# Patient Record
Sex: FEMALE | Race: WHITE
[De-identification: names, ages, dates, MRNs, and addresses within clinical notes are randomized per-mention and may not be internally consistent; named-entity substitution may affect disease eponyms.]

---

## 2018-11-25 ENCOUNTER — HOSPITAL ENCOUNTER (EMERGENCY)
Dept: HOSPITAL 61 - ER | Age: 83
Discharge: HOME | End: 2018-11-25
Payer: MEDICARE

## 2018-11-25 VITALS — SYSTOLIC BLOOD PRESSURE: 144 MMHG | DIASTOLIC BLOOD PRESSURE: 77 MMHG

## 2018-11-25 VITALS — WEIGHT: 175 LBS | BODY MASS INDEX: 32.2 KG/M2 | HEIGHT: 62 IN
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
## 2018-11-25 DIAGNOSIS — S80.01XA: Primary | ICD-10-CM
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
## 2018-11-25 DIAGNOSIS — F03.90: ICD-10-CM

## 2018-11-25 DIAGNOSIS — I10: ICD-10-CM

## 2018-11-25 DIAGNOSIS — S39.012A: ICD-10-CM
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
## 2018-11-25 DIAGNOSIS — S00.81XA: ICD-10-CM

## 2018-11-25 PROCEDURE — 73502 X-RAY EXAM HIP UNI 2-3 VIEWS: CPT

## 2018-11-25 PROCEDURE — 70450 CT HEAD/BRAIN W/O DYE: CPT

## 2018-11-25 PROCEDURE — 72100 X-RAY EXAM L-S SPINE 2/3 VWS: CPT

## 2018-11-25 PROCEDURE — 73562 X-RAY EXAM OF KNEE 3: CPT

## 2018-11-25 PROCEDURE — 72131 CT LUMBAR SPINE W/O DYE: CPT

## 2018-11-25 PROCEDURE — 99284 EMERGENCY DEPT VISIT MOD MDM: CPT

## 2018-11-25 NOTE — RAD
Two-view right hip 11/25/2018

 

CLINICAL INDICATION: Right hip pain after fall.

 

COMPARISON: None.

 

FINDINGS:

 

Right hip arthroplasty. No periprosthetic fracture or lucency. The 

visualized soft tissues are unremarkable.

 

IMPRESSION: Right hip arthroplasty that acute periprosthetic fracture or 

traumatic malalignment.

 

Electronically signed by: Ismael Piña MD (11/25/2018 1:12 PM) [facility]

## 2018-11-25 NOTE — RAD
3 view right knee 11/25/2018 12:27 PM

 

CLINICAL INDICATION: AMS. RIGHT KNEE PAIN,LACERATION AFTER FALL X1 DAY AGO

 

 

COMPARISON: None

 

FINDINGS: No acute fracture or traumatic malalignment. Joint spaces 

maintained. No significant knee joint effusion. Vascular calcifications 

posterior to the knee.

 

Impression:No acute osseous abnormality. 

 

Electronically signed by: Ismael Piña MD (11/25/2018 12:29 PM) [facility]

## 2018-11-25 NOTE — PHYS DOC
Past Medical History


Past Medical History:  Dementia, Hypertension


Smoking:  Cigarettes (The patient is a nonsmoker.)


Social History Narrative:  patient lives in a nursing home





Adult General


Chief Complaint


Chief Complaint:  MECHANICAL FALL





HPI


HPI


Patient is an 87-year-old white female who presents to the emergency department 

for evaluation. She has advanced dementia, and reportedly fell last night. She 

sustained an abrasion on her right forehead. She is ambulatory without any 

difficulty baseline including after the fall. It is unclear how she fell. The 

patient does not recall the incident. She is oriented to person, but 

disoriented to place and time. She denies any pain, and has no complaints at 

this time. She has not had any fevers or chills. She denies any headache or 

neck pain, numbness, weakness, hip or back pain. There are no alleviating or 

exacerbating factors to her symptoms.





Review of Systems


Review of Systems





Constitutional: Denies fever or chills []


Eyes: Denies change in visual acuity, redness, or eye pain []


HENT: Denies nasal congestion or sore throat []


Respiratory: Denies cough or shortness of breath []


Cardiovascular: The patient denies any shortness of breath, chest pain, 

palpitations, or orthopnea []


GI: Denies abdominal pain, nausea, vomiting, bloody stools or diarrhea []


: Denies dysuria or hematuria []


Musculoskeletal: Denies neck or back pain or joint pain []


Integument: Denies rash or skin lesions []


Neurologic: Denies headache, focal weakness or sensory changes []


Endocrine: Denies polyuria or polydipsia []





All other systems were reviewed and found to be within normal limits, except as 

documented in this note.





Current Medications


Current Medications





Current Medications








 Medications


  (Trade)  Dose


 Ordered  [facility]  Start Time


 Stop Time Status Last Admin


Dose Admin


 


 Acetaminophen/


 Codeine Phosphate


  (Tylenol #3)  1 tab  1X  ONCE  11/25/18 12:45


 11/25/18 12:46 DC 11/25/18 12:59


1 TAB











Allergies


Allergies





Allergies








Coded Allergies Type Severity Reaction Last Updated Verified


 


  No Known Drug Allergies    11/25/18 No











Physical Exam


Physical Exam


PHYSICAL EXAM:





CONSTITUTIONAL: Well developed, well nourished


HEAD: normocephalic, there is an abrasion on the anterior right forehead, the 

remainder of the cranium is atraumatic


EENT: PERRL, EOMI. Conjunctivae normal color, sclerae non-icteric; moist mucous 

membranes.


NECK: Supple, non-tender; no meningismus.There is full, painless range of 

motion of the cervical spine, without any focal bony midline tenderness to 

palpation.


LUNGS: Lungs CTA, breathing even and unlabored. Normal air movement.


HEART: Regular rate and rhythm, no murmur


CHEST: No deformity; non-tender


ABDOMEN: The abdomen is soft, and non-tender, no masses or bruits.


EXTREM: There is a contusion on the anterior aspect of the right knee, with a 

superficial abrasion. There is no bony tenderness to palpation. There is normal 

range of motion of the right knee without any ligamentous laxity. The remainder 

of the extremities are atraumatic, with Normal ROM; no deformity, no calf 

tenderness. Normal pulses palpable in all extremities. There is no pedal edema.


SKIN: No rash; no diaphoresis


NEURO: Alert; patient is oriented to person only, speech is otherwise normal, CN

's grossly intact; strength grossly intact without focal deficit.


BACK: No CVA TTP.





Current Patient Data


Vital Signs





 Vital Signs








  Date Time  Temp Pulse Resp B/P (MAP) Pulse Ox O2 Delivery O2 Flow Rate FiO2


 


11/25/18 12:59   16  100 Room Air  


 


11/25/18 11:16 98.0 75  140/72 (94)    





 98.0       











EKG


EKG


[]





Radiology/Procedures


Radiology/Procedures


[PROCEDURE: CT HEAD WO CONTRAST





CT head without contrast 11/25/2018


 


Clinical indications: Fall.


 


COMPARISON: None.


 


TECHNIQUE: Multiple CT images of the head were obtained without contrast.


 


*One or more of the following individualized dose reduction techniques 


were utilized for this examination:  


1. Automated exposure control.  


2. Adjustment of the mA and/or kV according to patient size.  


3. Use of iterative reconstruction technique.


 


 


FINDINGS:


No acute intracranial hemorrhage or extra-axial fluid collection. No 


midline shift. Prominence of the ventricles and subarachnoid spaces. The 


basal cisterns are patent. Patchy and confluent periventricular and deep 


white matter low-attenuation. The visualized mastoid air cells are well 


aerated. Paranasal sinus surgery noted.


 


IMPRESSION:


1. No acute intracranial hemorrhage.


2. Moderate nonspecific white matter disease, most, seen with chronic 


small vessel ischemic disease.


3. Mild generalized cerebral atrophy.]








ER physician preliminary right knee x-ray interpretation: No acute abnormality.





PROCEDURE: HIP RIGHT 2 VIEW





Two-view right hip 11/25/2018


 


CLINICAL INDICATION: Right hip pain after fall.


 


COMPARISON: None.


 


FINDINGS:


 


Right hip arthroplasty. No periprosthetic fracture or lucency. The 


visualized soft tissues are unremarkable.


 


IMPRESSION: Right hip arthroplasty that acute periprosthetic fracture or 


traumatic malalignment.


 





PROCEDURE: KNEE RIGHT 3V





3 view right knee 11/25/2018 12:27 PM


 


CLINICAL INDICATION: AMS. RIGHT KNEE PAIN,LACERATION AFTER FALL X1 DAY AGO


 


 


COMPARISON: None


 


FINDINGS: No acute fracture or traumatic malalignment. Joint spaces 


maintained. No significant knee joint effusion. Vascular calcifications 


posterior to the knee.


 


Impression:No acute osseous abnormality. 








PROCEDURE: LUMBAR SPINE 2-3V





3 view lumbar spine 11/25/2018


 


Clinical indications: Low back pain after fall.


 


COMPARISON: None.


 


FINDINGS:


 


There are 6 nonrib-bearing lumbar-type vertebral bodies with the 1st 


considered L1. Grade 2 anterolisthesis L5 on L6. Minimal grade 1 


retrolisthesis L3 on L4 and L4 on L5. Moderate central L3 compression 


deformity with estimated 40 percent vertebral body height loss. There is 


multilevel lumbar disc degeneration to a moderate degree at L2-L3, L3-L4 


and L5-L6 with disc space, endplate sclerosis and mild marginal osteophyte


formation at L3-L4. Probable spondylolisthesis L5 on L6.


 


There is calcified plaque of the abdominal aorta.


 


IMPRESSION:


1. 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


2. Moderate, age-indeterminate, L3 compression deformity.


3. Grade 2 anterolisthesis at L5 L6 and probable L5 spondylolysis.








PROCEDURE: CT LUMBAR SPINE WO CONTRAST





CT lumbar spine without contrast 11/25/2018


 


CLINICAL INDICATION: Fall with back pain.


 


COMPARISON: Same day lumbar spine radiograph


 


TECHNIQUE: Multiple CT images of the lumbar spine were obtained without 


contrast. 


 


*One or more of the following individualized dose reduction techniques 


were utilized for this examination:  


1. Automated exposure control.  


2. Adjustment of the mA and/or kV according to patient size.  


3. Use of iterative reconstruction technique.


 


 


FINDINGS:


There are 6 nonrib-bearing lumbar-type vertebral bodies with the 1st 


considered L1. Diffuse bony demineralization limiting bony detail. There 


is a mild to moderate central L3 compression deformity with estimated 30 


percent vertebral body height loss no significant bony retropulsion or 


acute fracture line. No paravertebral hemorrhage. Otherwise vertebral body


heights are maintained. Grade 2 anterolisthesis L5 on L6 measuring 11 mm. 


There bone stimulator leads posterior to the posterior elements of S1. 


Heterotopic ossification of the posterior elements L4 L5 L5 L6 and L6 S1.


 


Multilevel disc degeneration to moderate degree with disc space narrowing,


endplate sclerosis, marginal osteophyte formation and vertebral vascular 


disc phenomenon at L2-L3, L3-L4 and L5 L6.


 


Dense calcified plaque of the abdominal aorta. There is a small hiatal 


hernia. Left adrenal gland adenoma measuring 1.6 cm. Simple appearing 


exophytic cyst from the inferior pole the left kidney.


 


Segmental analysis:


 


At T12-L1, no significant spinal canal or neural foraminal narrowing.


 


At L1-L2, no significant spinal canal or neural foraminal narrowing.


 


At L2-L3, grade 1 retrolisthesis and facet hypertrophy without significant


spinal canal or neural foraminal narrowing.


 


At L3-L4, grade 1 retrolisthesis and facet hypertrophy resulting in mild 


left neural foraminal narrowing. No significant spinal canal narrowing.


 


At L4-L5, central/right central disc protrusion superimposed on mild 


concentric disc bulging with facet hypertrophy and ligamentum flavum 


thickening resulting in mild spinal canal narrowing.


 


At L5-L6, grade 2 anterolisthesis results in severe spinal canal and 


moderate right neural foraminal narrowing.


 


At L6 -S1, no significant spinal canal or neural foraminal narrowing.


 


IMPRESSION:


1. Note that there are 6 nonrib-bearing lumbar-type vertebral bodies.


2. Chronic appearing mild to moderate L3 compression deformity.


3. At L5-L6, grade 2 anterolisthesis resulting in severe spinal canal 


narrowing.


4. Additional levels of spinal canal and neural foraminal narrowing, as 


detailed above.





Course & Med Decision Making


Course & Med Decision Making


Pertinent and Imaging studies reviewed. (See chart for details)





[12:25 PM:Patient remains stable. I discussed test results, the need for close 

follow-up, and return precautions. Her gait will be tested prior to discharge.]


2:00 PM: The patient was able to stand but not ambulate secondary to pain, 

which she began complaining of in her lower back and right hip. This was not 

reported on her initial arrival, although the patient's ability to provide a 

meaningful history is significantly limited by her underlying dementia. The 

patient did undergo lumbar spine imaging which was abnormal and a follow-up CT 

reveal that the abnormalities are likely chronic not acute. I discussed the 

imaging results with the patient's family and the need for close outpatient 

follow-up and return precautions.





Dragon Disclaimer


Dragon Disclaimer


This electronic medical record was generated, in whole or in part, using a 

voice recognition dictation system.





Departure


Departure


Impression:  


 Primary Impression:  


 Closed head injury


 Additional Impressions:  


 Dementia


 Knee contusion


 Back strain


Disposition:  01 HOME, SELF-CARE


Condition:  STABLE


Patient Instructions:  Contusion, Head Injury, Adult





Problem Qualifiers











COLTON ARANGO MD Nov 25, 2018 11:24

## 2018-11-25 NOTE — RAD
CT head without contrast 11/25/2018

 

Clinical indications: Fall.

 

COMPARISON: None.

 

TECHNIQUE: Multiple CT images of the head were obtained without contrast.

 

*One or more of the following individualized dose reduction techniques 

were utilized for this examination:  

1. Automated exposure control.  

2. Adjustment of the mA and/or kV according to patient size.  

3. Use of iterative reconstruction technique.

 

 

FINDINGS:

No acute intracranial hemorrhage or extra-axial fluid collection. No 

midline shift. Prominence of the ventricles and subarachnoid spaces. The 

basal cisterns are patent. Patchy and confluent periventricular and deep 

white matter low-attenuation. The visualized mastoid air cells are well 

aerated. Paranasal sinus surgery noted.

 

IMPRESSION:

1. No acute intracranial hemorrhage.

2. Moderate nonspecific white matter disease, most, seen with chronic 

small vessel ischemic disease.

3. Mild generalized cerebral atrophy.

 

Electronically signed by: Ismael Piña MD (11/25/2018 11:47 AM) [facility]

## 2018-11-25 NOTE — RAD
3 view lumbar spine 11/25/2018

 

Clinical indications: Low back pain after fall.

 

COMPARISON: None.

 

FINDINGS:

 

There are 6 nonrib-bearing lumbar-type vertebral bodies with the 1st 

considered L1. Grade 2 anterolisthesis L5 on L6. Minimal grade 1 

retrolisthesis L3 on L4 and L4 on L5. Moderate central L3 compression 

deformity with estimated 40 percent vertebral body height loss. There is 

multilevel lumbar disc degeneration to a moderate degree at L2-L3, L3-L4 

and L5-L6 with disc space, endplate sclerosis and mild marginal osteophyte

formation at L3-L4. Probable spondylolisthesis L5 on L6.

 

There is calcified plaque of the abdominal aorta.

 

IMPRESSION:

1. 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

2. Moderate, age-indeterminate, L3 compression deformity.

3. Grade 2 anterolisthesis at L5 L6 and probable L5 spondylolysis.

 

Electronically signed by: Ismael Piña MD (11/25/2018 1:10 PM) [facility]

## 2018-11-25 NOTE — RAD
CT lumbar spine without contrast 11/25/2018

 

CLINICAL INDICATION: Fall with back pain.

 

COMPARISON: Same day lumbar spine radiograph

 

TECHNIQUE: Multiple CT images of the lumbar spine were obtained without 

contrast. 

 

*One or more of the following individualized dose reduction techniques 

were utilized for this examination:  

1. Automated exposure control.  

2. Adjustment of the mA and/or kV according to patient size.  

3. Use of iterative reconstruction technique.

 

 

FINDINGS:

There are 6 nonrib-bearing lumbar-type vertebral bodies with the 1st 

considered L1. Diffuse bony demineralization limiting bony detail. There 

is a mild to moderate central L3 compression deformity with estimated 30 

percent vertebral body height loss no significant bony retropulsion or 

acute fracture line. No paravertebral hemorrhage. Otherwise vertebral body

heights are maintained. Grade 2 anterolisthesis L5 on L6 measuring 11 mm. 

There bone stimulator leads posterior to the posterior elements of S1. 

Heterotopic ossification of the posterior elements L4 L5 L5 L6 and L6 S1.

 

Multilevel disc degeneration to moderate degree with disc space narrowing,

endplate sclerosis, marginal osteophyte formation and vertebral vascular 

disc phenomenon at L2-L3, L3-L4 and L5 L6.

 

Dense calcified plaque of the abdominal aorta. There is a small hiatal 

hernia. Left adrenal gland adenoma measuring 1.6 cm. Simple appearing 

exophytic cyst from the inferior pole the left kidney.

 

Segmental analysis:

 

At T12-L1, no significant spinal canal or neural foraminal narrowing.

 

At L1-L2, no significant spinal canal or neural foraminal narrowing.

 

At L2-L3, grade 1 retrolisthesis and facet hypertrophy without significant

spinal canal or neural foraminal narrowing.

 

At L3-L4, grade 1 retrolisthesis and facet hypertrophy resulting in mild 

left neural foraminal narrowing. No significant spinal canal narrowing.

 

At L4-L5, central/right central disc protrusion superimposed on mild 

concentric disc bulging with facet hypertrophy and ligamentum flavum 

thickening resulting in mild spinal canal narrowing.

 

At L5-L6, grade 2 anterolisthesis results in severe spinal canal and 

moderate right neural foraminal narrowing.

 

At L6 -S1, no significant spinal canal or neural foraminal narrowing.

 

IMPRESSION:

1. Note that there are 6 nonrib-bearing lumbar-type vertebral bodies.

2. Chronic appearing mild to moderate L3 compression deformity.

3. At L5-L6, grade 2 anterolisthesis resulting in severe spinal canal 

narrowing.

4. Additional levels of spinal canal and neural foraminal narrowing, as 

detailed above.

 

Electronically signed by: Ismael Piña MD (11/25/2018 1:55 PM) [facility]

## 2019-02-20 ENCOUNTER — HOSPITAL ENCOUNTER (EMERGENCY)
Dept: HOSPITAL 61 - ER | Age: 84
Discharge: HOME | End: 2019-02-20
Payer: MEDICARE

## 2019-02-20 VITALS — SYSTOLIC BLOOD PRESSURE: 134 MMHG | DIASTOLIC BLOOD PRESSURE: 74 MMHG

## 2019-02-20 VITALS — HEIGHT: 66 IN | WEIGHT: 155 LBS | BODY MASS INDEX: 24.91 KG/M2

## 2019-02-20 DIAGNOSIS — M43.16: ICD-10-CM

## 2019-02-20 DIAGNOSIS — M43.12: ICD-10-CM

## 2019-02-20 DIAGNOSIS — S61.412A: Primary | ICD-10-CM
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
## 2019-02-20 DIAGNOSIS — E27.9: ICD-10-CM

## 2019-02-20 DIAGNOSIS — N28.1: ICD-10-CM

## 2019-02-20 DIAGNOSIS — I10: ICD-10-CM

## 2019-02-20 DIAGNOSIS — M18.9: ICD-10-CM
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
## 2019-02-20 DIAGNOSIS — G30.9: ICD-10-CM
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
## 2019-02-20 DIAGNOSIS — K44.9: ICD-10-CM

## 2019-02-20 DIAGNOSIS — I51.7: ICD-10-CM
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
## 2019-02-20 DIAGNOSIS — F02.80: ICD-10-CM

## 2019-02-20 LAB
ANION GAP SERPL CALC-SCNC: 10 MMOL/L (ref 6–14)
APTT PPP: YELLOW S
BACTERIA #/AREA URNS HPF: 0 /HPF
BASOPHILS # BLD AUTO: 0.1 X10^3/UL (ref 0–0.2)
BASOPHILS NFR BLD: 1 % (ref 0–3)
BILIRUB UR QL STRIP: NEGATIVE
BUN SERPL-MCNC: 22 MG/DL (ref 7–20)
CALCIUM SERPL-MCNC: 9.1 MG/DL (ref 8.5–10.1)
CHLORIDE SERPL-SCNC: 102 MMOL/L (ref 98–107)
CO2 SERPL-SCNC: 30 MMOL/L (ref 21–32)
CREAT SERPL-MCNC: 2.1 MG/DL (ref 0.6–1)
EOSINOPHIL NFR BLD: 0.1 X10^3/UL (ref 0–0.7)
EOSINOPHIL NFR BLD: 1 % (ref 0–3)
ERYTHROCYTE [DISTWIDTH] IN BLOOD BY AUTOMATED COUNT: 17.2 % (ref 11.5–14.5)
FIBRINOGEN PPP-MCNC: CLEAR MG/DL
GFR SERPLBLD BASED ON 1.73 SQ M-ARVRAT: 22.3 ML/MIN
GLUCOSE SERPL-MCNC: 123 MG/DL (ref 70–99)
HCT VFR BLD CALC: 39.7 % (ref 36–47)
HGB BLD-MCNC: 12.9 G/DL (ref 12–15.5)
HYALINE CASTS #/AREA URNS LPF: (no result) /HPF
LYMPHOCYTES # BLD: 1.9 X10^3/UL (ref 1–4.8)
LYMPHOCYTES NFR BLD AUTO: 16 % (ref 24–48)
MCH RBC QN AUTO: 29 PG (ref 25–35)
MCHC RBC AUTO-ENTMCNC: 33 G/DL (ref 31–37)
MCV RBC AUTO: 91 FL (ref 79–100)
MONO #: 0.8 X10^3/UL (ref 0–1.1)
MONOCYTES NFR BLD: 7 % (ref 0–9)
NEUT #: 8.9 X10^3UL (ref 1.8–7.7)
NEUTROPHILS NFR BLD AUTO: 75 % (ref 31–73)
NITRITE UR QL STRIP: NEGATIVE
PH UR STRIP: 7 [PH]
PLATELET # BLD AUTO: 275 X10^3/UL (ref 140–400)
POTASSIUM SERPL-SCNC: 3.8 MMOL/L (ref 3.5–5.1)
PROT UR STRIP-MCNC: 30 MG/DL
RBC # BLD AUTO: 4.38 X10^6/UL (ref 3.5–5.4)
RBC #/AREA URNS HPF: 0 /HPF (ref 0–2)
SODIUM SERPL-SCNC: 142 MMOL/L (ref 136–145)
UROBILINOGEN UR-MCNC: 0.2 MG/DL
WBC # BLD AUTO: 11.8 X10^3/UL (ref 4–11)
WBC #/AREA URNS HPF: (no result) /HPF (ref 0–4)

## 2019-02-20 PROCEDURE — 72192 CT PELVIS W/O DYE: CPT

## 2019-02-20 PROCEDURE — 80048 BASIC METABOLIC PNL TOTAL CA: CPT

## 2019-02-20 PROCEDURE — 72125 CT NECK SPINE W/O DYE: CPT

## 2019-02-20 PROCEDURE — 81001 URINALYSIS AUTO W/SCOPE: CPT

## 2019-02-20 PROCEDURE — 12002 RPR S/N/AX/GEN/TRNK2.6-7.5CM: CPT

## 2019-02-20 PROCEDURE — 72128 CT CHEST SPINE W/O DYE: CPT

## 2019-02-20 PROCEDURE — 71045 X-RAY EXAM CHEST 1 VIEW: CPT

## 2019-02-20 PROCEDURE — P9612 CATHETERIZE FOR URINE SPEC: HCPCS

## 2019-02-20 PROCEDURE — 73130 X-RAY EXAM OF HAND: CPT

## 2019-02-20 PROCEDURE — 36415 COLL VENOUS BLD VENIPUNCTURE: CPT

## 2019-02-20 PROCEDURE — 84484 ASSAY OF TROPONIN QUANT: CPT

## 2019-02-20 PROCEDURE — 85025 COMPLETE CBC W/AUTO DIFF WBC: CPT

## 2019-02-20 PROCEDURE — 70450 CT HEAD/BRAIN W/O DYE: CPT

## 2019-02-20 PROCEDURE — 93005 ELECTROCARDIOGRAM TRACING: CPT

## 2019-02-20 PROCEDURE — 99284 EMERGENCY DEPT VISIT MOD MDM: CPT

## 2019-02-20 NOTE — RAD
PQRS Compliance Statement:

 

One or more of the following individualized dose reduction techniques were

utilized for this examination:  

1. Automated exposure control  

2. Adjustment of the mA and/or kV according to patient size  

3. Use of iterative reconstruction technique

 

CT pelvis and thoracic spine without contrast February 20, 2019

 

INDICATION: Unwitnessed fall.

 

COMPARISON: None available.

 

TECHNIQUE: Multiple axial CT images of the thoracic spine and pelvis were 

obtained without intravenous contrast. Coronal and sagittal reformats are 

provided.

 

FINDINGS:

 

Thoracic spine:

 

The thyroid gland is normal in appearance. There are no pathologically 

enlarged mediastinal and bilateral hilar lymph nodes. There is a moderate 

sized hiatal hernia. Three-vessel coronary artery vascular calcifications 

are identified. Heart size is mildly enlarged. There is a left adrenal 

mass with calcification measuring 2.1 x 1.8 cm with Hounsfield units 

measuring average -4 most suggestive of a lipid rich adrenal adenoma. 

Right adrenal gland is normal in appearance. There is a simple cyst 

arising from the inferior pole the left kidney measuring 3.5 cm. There is 

a simple cyst arising from the superior pole the left kidney measuring 11 

mm. No paraspinal soft tissue abnormality is identified. No epidural 

hematoma is noted. There is biapical pleural-parenchymal scarring.

 

Alignment of the thoracic spine appears normal. Vertebral body heights 

appear maintained. No acute fracture is identified. Mild multilevel disc 

height loss is identified with vacuum disc phenomenon. Mild to moderate 

anterior marginal osteophytosis is noted. There is concavity the superior 

endplate of L3 which is only partially profiled and appears chronic and 

likely secondary to superior endplate Schmorl's node.

 

Pelvis:

 

Distal abdominal aorta appears normal in caliber. There is dense calcified

atheromatous plaque involving the distal abdominal aorta and proximal 

iliac vessels. There are no pathologically enlarged lymph nodes in the 

pelvis. There is no free fluid. There is a small right inguinal hernia 

containing nondilated loops of small bowel. Urinary bladder is within 

normal limits given degree of distention. There is moderate colonic 

diverticulosis without adjacent inflammatory changes.

 

There is grade 2 anterolisthesis of L4 on L5 with bilateral L4 pars 

defects which appear chronic. L4 and L5 appear intact. Sacrum is intact.

 

Right total hip arthroplasty is identified with components in expected 

alignment. There is no lucency surrounding the hardware. Superior and 

inferior pubic rami are intact. Pubic symphysis is well aligned. 

Sacroiliac joints appear well aligned. Acetabula are intact. Productive 

changes are identified along the ischial tuberosities without definite 

fracture. No intramuscular hematoma is identified.

 

IMPRESSION:

1. No acute fracture or malalignment of the thoracic spine. Mild superior 

endplate concavity of L3 is only partially profiled and likely associated 

with chronic Schmorl's node.

2. No acute fracture of the pelvis. Right total hip arthroplasty is 

identified without evidence for hardware failure.

3. Grade 2 anterolisthesis of L4 on L5 likely secondary to chronic 

bilateral L4 pars defects.

4. Moderate-sized hiatal hernia.

5. Mild cardiomegaly.

6. Left adrenal mass, most favored to represent a lipid rich adrenal 

adenoma versus sequela of prior hemorrhage.

7. Left renal cysts.

 

Electronically signed by: Ailyn Morley MD (2/20/2019 5:12 PM) 

[facility]

## 2019-02-20 NOTE — PHYS DOC
Past Medical History


Past Medical History:  Dementia, Hypertension


Past Surgical History:  No Surgical History


Alcohol Use:  None


Drug Use:  None





Adult General


Chief Complaint


Chief Complaint:  UPPER EXTREMITY INJURY





HPI


HPI





Patient is a 87  year old female who presents with comes from [facility]. At approximately 1400 today patient was found on the floor outside of her 

room. Patient usually uses a rolling walker to ambulate. She has Alzheimer's 

and is alert and oriented 1 as always and is currently alert and oriented 1. 

Patient is a DNR. Fall was unwitnessed. Patient does have a dorsal hand full-

thickness laceration that is approximated, non bleeding, lateral and is 3 cm in 

length with tendon showing.





Review of Systems


Review of Systems





Constitutional: Denies fever or chills []


Eyes: Denies change in visual acuity, redness, or eye pain []


HENT: Denies nasal congestion or sore throat []


Respiratory: Denies cough or shortness of breath []


Cardiovascular: No additional information not addressed in HPI []


GI: Denies abdominal pain, nausea, vomiting, bloody stools or diarrhea []


: Denies dysuria or hematuria []


Musculoskeletal: Denies back pain or joint pain []


Integument: Laceration to dorsal left hand.  Denies rash or skin lesions []


Neurologic: Denies headache, focal weakness or sensory changes []








All other systems were reviewed and found to be within normal limits, except as 

documented in this note.





Current Medications


Current Medications





Current Medications








 Medications


  (Trade)  Dose


 Ordered  Sig/Josh  Start Time


 Stop Time Status Last Admin


Dose Admin


 


 Lidocaine/Sodium


 Bicarbonate


  (Buffered


 Lidocaine 1%)  3 ml  1X  ONCE  19 17:45


 19 17:46 DC 19 18:21


3 ML











Allergies


Allergies





Allergies








Coded Allergies Type Severity Reaction Last Updated Verified


 


  No Known Drug Allergies    18 No











Physical Exam


Physical Exam





Constitutional: Well developed, well nourished, no acute distress, non-toxic 

appearance. []


HENT: Normocephalic, atraumatic, bilateral external ears normal, oropharynx 

moist, no oral exudates, nose normal. []


Eyes: PERRLA, EOMI, conjunctiva normal, no discharge. [] 


Neck: Normal range of motion, no tenderness, supple, no stridor. [] 


Cardiovascular:Heart rate regular rhythm, no murmur []


Lungs & Thorax:  Bilateral breath sounds clear to auscultation []


Abdomen: Bowel sounds normal, soft, no tenderness, no masses, no pulsatile 

masses. [] 


Skin: 3cm lacerations, left dorsal hand. Warm, dry, no erythema, no rash. [] 


Back: No tenderness, no CVA tenderness. [] 


Extremities: No tenderness, no cyanosis, no clubbing, ROM intact, no edema. [] 


Neurologic: Alert and oriented X 3, normal motor function, normal sensory 

function, no focal deficits noted. []


Psychologic: Affect normal, judgement normal, mood normal. []





Current Patient Data


Vital Signs





 Vital Signs








  Date Time  Temp Pulse Resp B/P (MAP) Pulse Ox O2 Delivery O2 Flow Rate FiO2


 


19 15:22 98.4 77 18 122/76 (91) 97 Room Air  





 98.4       








Lab Values





 Laboratory Tests








Test


 19


17:10 19


17:25


 


Urine Collection Type Unknown   


 


Urine Color Yellow   


 


Urine Clarity Clear   


 


Urine pH 7.0   


 


Urine Specific Gravity 1.010   


 


Urine Protein


 30 mg/dL


(NEG-TRACE) 





 


Urine Glucose (UA)


 Negative mg/dL


(NEG) 





 


Urine Ketones (Stick)


 Negative mg/dL


(NEG) 





 


Urine Blood


 Negative (NEG)


 





 


Urine Nitrite


 Negative (NEG)


 





 


Urine Bilirubin


 Negative (NEG)


 





 


Urine Urobilinogen Dipstick


 0.2 mg/dL (0.2


mg/dL) 





 


Urine Leukocyte Esterase


 Negative (NEG)


 





 


Urine RBC 0 /HPF (0-2)   


 


Urine WBC


 Occ /HPF (0-4)


 





 


Urine Bacteria


 0 /HPF (0-FEW)


 





 


Urine Hyaline Casts Few /HPF   


 


White Blood Count


 


 11.8 x10^3/uL


(4.0-11.0)  H


 


Red Blood Count


 


 4.38 x10^6/uL


(3.50-5.40)


 


Hemoglobin


 


 12.9 g/dL


(12.0-15.5)


 


Hematocrit


 


 39.7 %


(36.0-47.0)


 


Mean Corpuscular Volume


 


 91 fL ()





 


Mean Corpuscular Hemoglobin  29 pg (25-35)  


 


Mean Corpuscular Hemoglobin


Concent 


 33 g/dL


(31-37)


 


Red Cell Distribution Width


 


 17.2 %


(11.5-14.5)  H


 


Platelet Count


 


 275 x10^3/uL


(140-400)


 


Neutrophils (%) (Auto)  75 % (31-73)  H


 


Lymphocytes (%) (Auto)  16 % (24-48)  L


 


Monocytes (%) (Auto)  7 % (0-9)  


 


Eosinophils (%) (Auto)  1 % (0-3)  


 


Basophils (%) (Auto)  1 % (0-3)  


 


Neutrophils # (Auto)


 


 8.9 x10^3uL


(1.8-7.7)  H


 


Lymphocytes # (Auto)


 


 1.9 x10^3/uL


(1.0-4.8)


 


Monocytes # (Auto)


 


 0.8 x10^3/uL


(0.0-1.1)


 


Eosinophils # (Auto)


 


 0.1 x10^3/uL


(0.0-0.7)


 


Basophils # (Auto)


 


 0.1 x10^3/uL


(0.0-0.2)


 


Sodium Level


 


 142 mmol/L


(136-145)


 


Potassium Level


 


 3.8 mmol/L


(3.5-5.1)


 


Chloride Level


 


 102 mmol/L


()


 


Carbon Dioxide Level


 


 30 mmol/L


(21-32)


 


Anion Gap  10 (6-14)  


 


Blood Urea Nitrogen


 


 22 mg/dL


(7-20)  H


 


Creatinine


 


 2.1 mg/dL


(0.6-1.0)  H


 


Estimated GFR


(Cockcroft-Gault) 


 22.3  





 


Glucose Level


 


 123 mg/dL


(70-99)  H


 


Calcium Level


 


 9.1 mg/dL


(8.5-10.1)


 


Troponin I Quantitative


 


 < 0.017 ng/mL


(0.000-0.055)





 Laboratory Tests


19 17:25








 Laboratory Tests


19 17:25











EKG


EKG


Sinus Rhythm, no STEMI


Interpretation Time:


1701 and read by Dr. Lopez





Radiology/Procedures


Radiology/Procedures


[]


Impressions:


[facility]


 [address]  [place], [place] [address] [phone]


 


 IMAGING REPORT





 Signed





PATIENT: IDRIS BLOCK ACCOUNT: [id number] MRN#: [id number]


: 1931 LOCATION: ER AGE: 87


SEX: F EXAM DT: 19 ACCESSION#: [id number]


STATUS: REG ER ORD. PHYSICIAN: SHEREEN WALTON 


REASON: FALL, UNWITNESSED


PROCEDURE: PORTABLE CHEST 1V





Chest radiograph 2019 5:17 PM


 


INDICATION: Trauma, fall


 


COMPARISON: None available


 


TECHNIQUE: Portable upright frontal view of the chest is provided.


 


FINDINGS:


 


The cardiomediastinal silhouette is within normal limits.


 


There are no pleural effusions. There is no pulmonary vascular congestion.


There is no pneumothorax.


 


The lungs are clear.


 


No significant osseous abnormality is identified.


 


IMPRESSION:


 


No acute cardiopulmonary process.


 


Electronically signed by: Indio Benavides MD (2019 5:54 PM) 


[facility]














DICTATED and SIGNED BY:     INDIO BENAVIDES MD


DATE:     19 1753





 [facility]


 [address] [place] [address] [phone]


 


 IMAGING REPORT





 Signed





PATIENT: IDRIS BLOCK ACCOUNT: [id number] MRN#: [id number]


: 1931 LOCATION: ER AGE: 87


SEX: F EXAM DT: 19 ACCESSION#: [id number]


STATUS: REG ER ORD. PHYSICIAN: SHEREEN WALTON 


REASON: FALL, UNWITNESSED


PROCEDURE: HAND LEFT 3V





Left hand radiograph 2019 5:17 PM


 


INDICATION: Trauma, Fall. Laceration to the dorsal aspect of the left 


hand.


 


COMPARISON: None available.


 


TECHNIQUE:  3 views of the left hand are provided.


 


FINDINGS:


 


There is moderate joint space narrowing with minimal likely chronic 


subluxation of the distal interphalangeal joint. Marginal osteophytosis is


noted. Findings are suggestive of moderate to advanced osteoarthrosis. 


Bone mineralization is within normal limits. Dorsal soft tissue laceration


is identified. There is moderate joint space narrowing involving the first


carpometacarpal joint with marginal osteophytosis compatible with 


osteoarthrosis. No acute fracture is identified.


 


IMPRESSION:


1. Moderate to advanced osteoarthrosis of the second distal 


interphalangeal joint with minimal chronic subluxation.


2. No acute fracture or dislocation.


3. Moderate osteoarthrosis of the first carpometacarpal joint.


 


Electronically signed by: Indio Benavides MD (2019 5:56 PM) 


[facility]














DICTATED and SIGNED BY:     INDIO BENAVIDES MD


DATE:     19





 [facility]


 [address] [place] [address]


 [phone]


 


 IMAGING REPORT





 Signed





PATIENT: IDRIS BLOCK ACCOUNT: [id number] MRN#: [id number]


: 1931 LOCATION: ER AGE: 87


SEX: F EXAM DT: 19 ACCESSION#: [id number]


STATUS: REG ER ORD. PHYSICIAN: SHEREEN WALTON 


REASON: FALL, UNWITNESSES


PROCEDURE: CT HEAD AND CERVICAL SPINE WO





CT of the head without contrast, 2019:


 


HISTORY: Fall, dementia


 


Comparison is made to a study from 2018. There is moderate cerebral 


atrophy. There are moderate patchy bilateral deep white matter lucencies 


compatible with chronic ischemic change. There is also mild cerebellar 


atrophy. The ventricles are mildly enlarged on a compensatory basis. There


is no shift of the midline structures. There is no evidence of acute 


intracranial hemorrhage or mass effect. There is calcific plaquing of the 


distal internal carotid and vertebral arteries.


 


IMPRESSION:


1. Chronic findings as described above.


2. No acute intracranial abnormality is detected.


 


 


 


CT of the cervical spine without contrast, 2019:


 


Noncontrast scans were obtained with multiplanar reconstructions produced.


 


There is moderate disc space narrowing and marginal spurring throughout 


the mid and lower cervical spine. There are moderate degenerative changes 


involving scattered facet joints bilaterally. A slight anterolisthesis at 


C3-4 appears to be due to facet joint arthropathy. No acute fracture is 


identified. No high-grade central spinal stenosis is present. There is 


foraminal stenosis at multiple levels, most severe at C5-6 and C6-7 


bilaterally and at C4-5 on the right.


 


IMPRESSION:


1. Moderate to severe multilevel degenerative change as described above.


2. Mild anterolisthesis at C3-4 due to facet joint arthropathy.


3. No acute bony abnormality is detected.


 


Electronically signed by: Rick Moritz, MD (2019 5:05 PM) [facility]














DICTATED and SIGNED BY:     MORITZ,RICK S MD


DATE:     19 1657





 [facility]


 [address] [place] [address]


 [phone]


 


 IMAGING REPORT





 Signed





PATIENT: IDRIS BLOCK ACCOUNT: [id number] MRN#: [id number]


: 1931 LOCATION: ER AGE: 87


SEX: F EXAM DT: 19 ACCESSION#: [id number]


STATUS: REG ER ORD. PHYSICIAN: SHEREEN WALTON 


REASON: FALL, UNWITNESSES


PROCEDURE: CT PELVIS WO CONTRAST





PQRS Compliance Statement:


 


One or more of the following individualized dose reduction techniques were


utilized for this examination:  


1. Automated exposure control  


2. Adjustment of the mA and/or kV according to patient size  


3. Use of iterative reconstruction technique


 


CT pelvis and thoracic spine without contrast 2019


 


INDICATION: Unwitnessed fall.


 


COMPARISON: None available.


 


TECHNIQUE: Multiple axial CT images of the thoracic spine and pelvis were 


obtained without intravenous contrast. Coronal and sagittal reformats are 


provided.


 


FINDINGS:


 


Thoracic spine:


 


The thyroid gland is normal in appearance. There are no pathologically 


enlarged mediastinal and bilateral hilar lymph nodes. There is a moderate 


sized hiatal hernia. Three-vessel coronary artery vascular calcifications 


are identified. Heart size is mildly enlarged. There is a left adrenal 


mass with calcification measuring 2.1 x 1.8 cm with Hounsfield units 


measuring average -4 most suggestive of a lipid rich adrenal adenoma. 


Right adrenal gland is normal in appearance. There is a simple cyst 


arising from the inferior pole the left kidney measuring 3.5 cm. There is 


a simple cyst arising from the superior pole the left kidney measuring 11 


mm. No paraspinal soft tissue abnormality is identified. No epidural 


hematoma is noted. There is biapical pleural-parenchymal scarring.


 


Alignment of the thoracic spine appears normal. Vertebral body heights 


appear maintained. No acute fracture is identified. Mild multilevel disc 


height loss is identified with vacuum disc phenomenon. Mild to moderate 


anterior marginal osteophytosis is noted. There is concavity the superior 


endplate of L3 which is only partially profiled and appears chronic and 


likely secondary to superior endplate Schmorl's node.


 


Pelvis:


 


Distal abdominal aorta appears normal in caliber. There is dense calcified


atheromatous plaque involving the distal abdominal aorta and proximal 


iliac vessels. There are no pathologically enlarged lymph nodes in the 


pelvis. There is no free fluid. There is a small right inguinal hernia 


containing nondilated loops of small bowel. Urinary bladder is within 


normal limits given degree of distention. There is moderate colonic 


diverticulosis without adjacent inflammatory changes.


 


There is grade 2 anterolisthesis of L4 on L5 with bilateral L4 pars 


defects which appear chronic. L4 and L5 appear intact. Sacrum is intact.


 


Right total hip arthroplasty is identified with components in expected 


alignment. There is no lucency surrounding the hardware. Superior and 


inferior pubic rami are intact. Pubic symphysis is well aligned. 


Sacroiliac joints appear well aligned. Acetabula are intact. Productive 


changes are identified along the ischial tuberosities without definite 


fracture. No intramuscular hematoma is identified.


 


IMPRESSION:


1. No acute fracture or malalignment of the thoracic spine. Mild superior 


endplate concavity of L3 is only partially profiled and likely associated 


with chronic Schmorl's node.


2. No acute fracture of the pelvis. Right total hip arthroplasty is 


identified without evidence for hardware failure.


3. Grade 2 anterolisthesis of L4 on L5 likely secondary to chronic 


bilateral L4 pars defects.


4. Moderate-sized hiatal hernia.


5. Mild cardiomegaly.


6. Left adrenal mass, most favored to represent a lipid rich adrenal 


adenoma versus sequela of prior hemorrhage.


7. Left renal cysts.


 


Electronically signed by: Indio Benavides MD (2019 5:12 PM) 


[facility]














DICTATED and SIGNED BY:     INDIO BENAVIDES MD


DATE:     19 1700








Course & Med Decision Making


Course & Med Decision Making


Patient is a 87  year old female who presents with comes from [facility]. At approximately 1400 today patient was found on the floor outside of her 

room. Patient usually uses a rolling walker to ambulate. She has Alzheimer's 

and is alert and oriented 1 as always and is currently alert and oriented 1. 

Patient is a DNR. Fall was unwitnessed. Patient does have a left hand dorsal 

hand full-thickness laceration that is approximated, non bleeding, lateral and 

is 4 cm in length with tendon showing right above the ring finger. Patient is 

very pleasant. When examining the patient's body from head to toe there are no 

other abrasions, deformities, tenderness, or bruises. Patient has no focal 

spiny tenderness and has full range of motion of her neck. Patient has full 

range of motion all extremities with normal strengths in all extremities. The 

nursing facility did state that the patient was able to get up on her own and 

started walking around with her walker after the fall. Patient has no 

complaints and states she has no pain. When examining the left hand patient can 

make a fist and extend all fingers out and bend all fingers without problem. 

The ligament is intact and not torn. Radial pulses present and strong. Cap 

refill less than 3 seconds. Patient has no tenderness in the hand, fingers, 

wrist, forearm, elbow, humerus or shoulder. Patient has a overall negative exam 

except for the laceration to her left hand. PERRLA.





CT scans and Xrays show no acute findings. Patient will be sent back to the 

nursing home and needs to follow-up with her primary care physician this week. 

Patient should have her sutures removed in 7 days either by coming back to the 

ED or go into her primary care physician.





Laceration Repair by me:


Anesthesia:         1% lidocaine locally


Location:         Left dorsal hand


Tendon/Joint/Nerves:      No injury


Foreign body:      None detected after copious irrigation and exploration


Technique:         6 Simple Interrupted Sutures


Complexity:         No subcutaneous sutures/mucosal repair/edge excision


Post Closure Length:      4 cm





Patient's bleeding was easily controlled in the department and there is no 

indication of anemia.


No evidence of compartment syndrome, neurologic injury, vascular injury, open 

joint, tendon laceration, or foreign body.


Patient is appropriate for outpatient follow up.





48 hour wound check.  Scar minimization instructions given.





Dragon Disclaimer


Dragon Disclaimer


This electronic medical record was generated, in whole or in part, using a 

voice recognition dictation system.





Departure


Departure


Impression:  


 Primary Impression:  


 Fall


 Additional Impression:  


 Laceration


Disposition:  01 HOME, SELF-CARE


Condition:  STABLE


Referrals:  


UNKNOWN PCP NAME (PCP)


Patient Instructions:  Fall Prevention and Home Safety, Laceration Care, Adult





Additional Instructions:  


Use Tylenol or Ibuprofen for pain. Follow-up with her primary care physician 

this week. Keep area clean and covered. Patient should have her sutures removed 

in 7 days either by coming back to the ED or go into her primary care physician.


Scripts


Amoxicillin/Potassium Clav (AUGMENTIN 875-125 TABLET) 1 Each Tablet


1 TAB PO BID for 5 Days, #10 TAB


   Prov: SHEREEN WALTON         19





Problem Qualifiers








 Primary Impression:  


 Fall


 Encounter type:  initial encounter  Qualified Codes:  W19.XXXA - Unspecified 

fall, initial encounter








SHEREEN WALTON 2019 16:36

## 2019-02-20 NOTE — RAD
Left hand radiograph 2/20/2019 5:17 PM

 

INDICATION: Trauma, Fall. Laceration to the dorsal aspect of the left 

hand.

 

COMPARISON: None available.

 

TECHNIQUE:  3 views of the left hand are provided.

 

FINDINGS:

 

There is moderate joint space narrowing with minimal likely chronic 

subluxation of the distal interphalangeal joint. Marginal osteophytosis is

noted. Findings are suggestive of moderate to advanced osteoarthrosis. 

Bone mineralization is within normal limits. Dorsal soft tissue laceration

is identified. There is moderate joint space narrowing involving the first

carpometacarpal joint with marginal osteophytosis compatible with 

osteoarthrosis. No acute fracture is identified.

 

IMPRESSION:

1. Moderate to advanced osteoarthrosis of the second distal 

interphalangeal joint with minimal chronic subluxation.

2. No acute fracture or dislocation.

3. Moderate osteoarthrosis of the first carpometacarpal joint.

 

Electronically signed by: Ailyn Morley MD (2/20/2019 5:56 PM) 

[facility]

## 2019-02-20 NOTE — RAD
Chest radiograph 2/20/2019 5:17 PM

 

INDICATION: Trauma, fall

 

COMPARISON: None available

 

TECHNIQUE: Portable upright frontal view of the chest is provided.

 

FINDINGS:

 

The cardiomediastinal silhouette is within normal limits.

 

There are no pleural effusions. There is no pulmonary vascular congestion.

There is no pneumothorax.

 

The lungs are clear.

 

No significant osseous abnormality is identified.

 

IMPRESSION:

 

No acute cardiopulmonary process.

 

Electronically signed by: Ailyn Morley MD (2/20/2019 5:54 PM) 

[facility]

## 2019-02-20 NOTE — RAD
CT of the head without contrast, 2/20/2019:

 

HISTORY: Fall, dementia

 

Comparison is made to a study from 11/25/2018. There is moderate cerebral 

atrophy. There are moderate patchy bilateral deep white matter lucencies 

compatible with chronic ischemic change. There is also mild cerebellar 

atrophy. The ventricles are mildly enlarged on a compensatory basis. There

is no shift of the midline structures. There is no evidence of acute 

intracranial hemorrhage or mass effect. There is calcific plaquing of the 

distal internal carotid and vertebral arteries.

 

IMPRESSION:

1. Chronic findings as described above.

2. No acute intracranial abnormality is detected.

 

 

 

CT of the cervical spine without contrast, 2/20/2019:

 

Noncontrast scans were obtained with multiplanar reconstructions produced.

 

There is moderate disc space narrowing and marginal spurring throughout 

the mid and lower cervical spine. There are moderate degenerative changes 

involving scattered facet joints bilaterally. A slight anterolisthesis at 

C3-4 appears to be due to facet joint arthropathy. No acute fracture is 

identified. No high-grade central spinal stenosis is present. There is 

foraminal stenosis at multiple levels, most severe at C5-6 and C6-7 

bilaterally and at C4-5 on the right.

 

IMPRESSION:

1. Moderate to severe multilevel degenerative change as described above.

2. Mild anterolisthesis at C3-4 due to facet joint arthropathy.

3. No acute bony abnormality is detected.

 

Electronically signed by: Rick Moritz, MD (2/20/2019 5:05 PM) [facility]

## 2019-02-21 NOTE — EKG
[facility]

              [address] [place] [address]

Test Date:    2019               Test Time:    17:01:57

Pat Name:     IDRIS BLOCK               Department:   

Patient ID:   [id number]           Room:          

Gender:       F                        Technician:   

:          [id number]               Requested By: SHEREEN WALTON

Order Number: [id number]           Reading MD:     

                                 Measurements

Intervals                              Axis          

Rate:                                  P:            

NC:                                    QRS:          

QRSD:                                  T:            

QT:                                                  

QTc:                                                 

                           Interpretive Statements

## 2021-10-06 NOTE — RAD
"SUBJECTIVE:   Mariia Tinoco is a 85 year old female who presents for Preventive Visit.    Tete comes in today for her annual wellness.  She is very discouraged.  Her hematoma on her right leg still has not healed.  She is also been dealing with a lot of left knee pain.  She has an appointment with an orthopedist here in the next couple weeks.  It is hard for her to get around with this pain.  She is down in her mood as a result.    She has osteoporosis.  She is finished up 5 years of Prolia.  Dr. Deleon had recommended a shot of Reclast after and then a drug holiday.  We discussed that today.  She will be due for bone density next year.    She had Covid.  She then had mild donor.  She is not immune suppressed but she does have chronic pulmonary issues.  She wonders when she should get her mood during the booster.    She is due for an INR today.    She needs a new pulmonologist and she wants to stick with her current [facility] lung clinic.  Wonders who she should see.    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  Kids grandkids and great grandkids are all doing well.  Patient has been advised of split billing requirements and indicates understanding: Yes   Are you in the first 12 months of your Medicare coverage?  No    Healthy Habits:     In general, how would you rate your overall health?  Fair    Frequency of exercise:  2-3 days/week    Duration of exercise:  15-30 minutes    Do you usually eat at least 4 servings of fruit and vegetables a day, include whole grains    & fiber and avoid regularly eating high fat or \"junk\" foods?  No    Taking medications regularly:  Yes    Medication side effects:  None    Ability to successfully perform activities of daily living:  Transportation requires assistance    Home Safety:  No safety concerns identified    Hearing Impairment:  No hearing concerns    In the past 6 months, have you been bothered by leaking of urine? Yes    In general, " PQRS Compliance Statement:

 

One or more of the following individualized dose reduction techniques were

utilized for this examination:  

1. Automated exposure control  

2. Adjustment of the mA and/or kV according to patient size  

3. Use of iterative reconstruction technique

 

CT pelvis and thoracic spine without contrast February 20, 2019

 

INDICATION: Unwitnessed fall.

 

COMPARISON: None available.

 

TECHNIQUE: Multiple axial CT images of the thoracic spine and pelvis were 

obtained without intravenous contrast. Coronal and sagittal reformats are 

provided.

 

FINDINGS:

 

Thoracic spine:

 

The thyroid gland is normal in appearance. There are no pathologically 

enlarged mediastinal and bilateral hilar lymph nodes. There is a moderate 

sized hiatal hernia. Three-vessel coronary artery vascular calcifications 

are identified. Heart size is mildly enlarged. There is a left adrenal 

mass with calcification measuring 2.1 x 1.8 cm with Hounsfield units 

measuring average -4 most suggestive of a lipid rich adrenal adenoma. 

Right adrenal gland is normal in appearance. There is a simple cyst 

arising from the inferior pole the left kidney measuring 3.5 cm. There is 

a simple cyst arising from the superior pole the left kidney measuring 11 

mm. No paraspinal soft tissue abnormality is identified. No epidural 

hematoma is noted. There is biapical pleural-parenchymal scarring.

 

Alignment of the thoracic spine appears normal. Vertebral body heights 

appear maintained. No acute fracture is identified. Mild multilevel disc 

height loss is identified with vacuum disc phenomenon. Mild to moderate 

anterior marginal osteophytosis is noted. There is concavity the superior 

endplate of L3 which is only partially profiled and appears chronic and 

likely secondary to superior endplate Schmorl's node.

 

Pelvis:

 

Distal abdominal aorta appears normal in caliber. There is dense calcified

atheromatous plaque involving the distal abdominal aorta and proximal 

iliac vessels. There are no pathologically enlarged lymph nodes in the 

pelvis. There is no free fluid. There is a small right inguinal hernia 

containing nondilated loops of small bowel. Urinary bladder is within 

normal limits given degree of distention. There is moderate colonic 

diverticulosis without adjacent inflammatory changes.

 

There is grade 2 anterolisthesis of L4 on L5 with bilateral L4 pars 

defects which appear chronic. L4 and L5 appear intact. Sacrum is intact.

 

Right total hip arthroplasty is identified with components in expected 

alignment. There is no lucency surrounding the hardware. Superior and 

inferior pubic rami are intact. Pubic symphysis is well aligned. 

Sacroiliac joints appear well aligned. Acetabula are intact. Productive 

changes are identified along the ischial tuberosities without definite 

fracture. No intramuscular hematoma is identified.

 

IMPRESSION:

1. No acute fracture or malalignment of the thoracic spine. Mild superior 

endplate concavity of L3 is only partially profiled and likely associated 

with chronic Schmorl's node.

2. No acute fracture of the pelvis. Right total hip arthroplasty is 

identified without evidence for hardware failure.

3. Grade 2 anterolisthesis of L4 on L5 likely secondary to chronic 

bilateral L4 pars defects.

4. Moderate-sized hiatal hernia.

5. Mild cardiomegaly.

6. Left adrenal mass, most favored to represent a lipid rich adrenal 

adenoma versus sequela of prior hemorrhage.

7. Left renal cysts.

 

Electronically signed by: Ailyn Morley MD (2/20/2019 5:12 PM) 

[facility] how would you rate your overall mental or emotional health?  Good      PHQ-2 Total Score: 1    Additional concerns today:  No    Do you feel safe in your environment? Yes    Have you ever done Advance Care Planning? (For example, a Health Directive, POLST, or a discussion with a medical provider or your loved ones about your wishes): Yes, advance care planning is on file.        Fall risk - No recent Falls     click delete button to remove this line now  Cognitive Screening   1) Repeat 3 items (Leader, Season, Table)    2) Clock draw: NORMAL  3) 3 item recall: Recalls 3 objects  Results: NORMAL clock    Mini-CogTM Copyright S [employer]ato. Licensed by the author for use in [facility]; reprinted with permission (soob@[facility]). All rights reserved.      Do you have sleep apnea, excessive snoring or daytime drowsiness?: no    Reviewed and updated as needed this visit by clinical staff  Tobacco   Meds              Reviewed and updated as needed this visit by Provider                Social History     Tobacco Use     Smoking status: Never Smoker     Smokeless tobacco: Never Used   Substance Use Topics     Alcohol use: Yes     Alcohol/week: 1.0 standard drinks     Comment: Alcoholic Drinks/day: occasional     If you drink alcohol do you typically have >3 drinks per day or >7 drinks per week? No    Alcohol Use 10/6/2021   Prescreen: >3 drinks/day or >7 drinks/week? No       Current providers sharing in care for this patient include:   Patient Care Team:  Eagle Shabazz MD as PCP - General  Patti Petersen OT (Inactive) as Occupational Therapist (Occupational Therapy)  Joseluis Call MD as Referring Physician (Ophthalmology)  Eagle Shabazz MD as Assigned PCP  Ryan Johnson as MD    The following health maintenance items are reviewed in Epic and correct as of today:  Health Maintenance Due   Topic Date Due     ANNUAL REVIEW OF HM ORDERS  Never done     COPD ACTION PLAN  Never done     INFLUENZA VACCINE  "(1) 09/01/2021     FALL RISK ASSESSMENT  09/15/2021     MEDICARE ANNUAL WELLNESS VISIT  09/15/2021             Pertinent mammograms are reviewed under the imaging tab.    Review of Systems  Constitutional, HEENT, cardiovascular, pulmonary, GI, , musculoskeletal, neuro, skin, endocrine and psych systems are negative, except as otherwise noted.    OBJECTIVE:   Temp 97.9  F (36.6  C)   Ht 1.626 m (5' 4\")   Wt 71.7 kg (158 lb)   BMI 27.12 kg/m   Estimated body mass index is 27.12 kg/m  as calculated from the following:    Height as of this encounter: 1.626 m (5' 4\").    Weight as of this encounter: 71.7 kg (158 lb).  Physical Exam  GENERAL: healthy, alert and no distress  NECK: no adenopathy, no asymmetry, masses, or scars and thyroid normal to palpation  RESP: lungs clear to auscultation - no rales, rhonchi or wheezes  CV: regular rate and rhythm, normal S1 S2, no S3 or S4, no murmur, click or rub, no peripheral edema and peripheral pulses strong  ABDOMEN: soft, nontender, no hepatosplenomegaly, no masses and bowel sounds normal  MS: no gross musculoskeletal defects noted, no edema  SKIN: no suspicious lesions or rashes  PSYCH: mentation appears normal, affect normal/bright    Diagnostic Test Results:  Labs reviewed in Epic    ASSESSMENT / PLAN:   1. Encounter for Medicare annual wellness exam  We discussed her Covid vaccination.  She does not qualify for booster for mood during the yet.  She did have Covid already and got fully vaccinated so I think were safe.  She will for bone density in March.  She will get her flu shot soon.    2. Hypothyroidism, unspecified type  Seems euthyroid.  Check TSH.  - TSH with free T4 reflex; Future    3. Chronic Interstitial Lung Disease  I have recommended Dr. Rich.    4. Persistent atrial fibrillation (H)  INR today.  Continue current regimen.  - Lipid panel reflex to direct LDL Fasting; Future  - Comprehensive metabolic panel (BMP + Alb, Alk Phos, ALT, AST, Total. Bili, " "TP); Future  - CBC with platelets and differential; Future    5. Cardiomyopathy, unspecified type (H)  She follows closely with her cardiologist.  - UA Macro with Reflex to Micro and Culture - lab collect; Future    6. Chronic bronchitis, unspecified chronic bronchitis type (H)  Asymptomatic.  Follow-up with Dr. Rich.    7. Osteoporosis, senile on Prolia  She is now status post 5 years of Prolia.  We will set her up with 1 dose of Reclast and then put on drug holiday.  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    9. Leg hematoma, right, sequela  Reassurance today.    10. Primary osteoarthritis of left knee  If her pain is that bad I did recommend going to an urgent care.  We also discussed can trial of Cymbalta and she declines.    11. Encounter for screening for cardiovascular disorders     - Lipid panel reflex to direct LDL Fasting; Future    Patient has been advised of split billing requirements and indicates understanding: Yes  COUNSELING:  Reviewed preventive health counseling, as reflected in patient instructions    The patient was provided with suggestions to help her develop a healthy physical lifestyle.  The patient was counseled and encouraged to consider modifying their diet and eating habits. She was provided with information on recommended healthy diet options.  The patient reports that she has difficulty with activities of daily living. This issue was addressed at today's appointment.  Her  drives that she is unable to do due to her blindness.  Information on urinary incontinence and treatment options given to patient.  Estimated body mass index is 27.12 kg/m  as calculated from the following:    Height as of this encounter: 1.626 m (5' 4\").    Weight as of this encounter: 71.7 kg (158 lb).        She reports that she has never smoked. She has never used smokeless tobacco.      Appropriate preventive services were discussed with this patient, including " applicable screening as appropriate for cardiovascular disease, diabetes, osteopenia/osteoporosis, and glaucoma.  As appropriate for age/gender, discussed screening for colorectal cancer, prostate cancer, breast cancer, and cervical cancer. Checklist reviewing preventive services available has been given to the patient.    Reviewed patients plan of care and provided an AVS. The Basic Care Plan (routine screening as documented in Health Maintenance) for Mariia meets the Care Plan requirement. This Care Plan has been established and reviewed with the Patient.    Counseling Resources:  ATP IV Guidelines  Pooled Cohorts Equation Calculator  Breast Cancer Risk Calculator  Breast Cancer: Medication to Reduce Risk  FRAX Risk Assessment  ICSI Preventive Guidelines  Dietary Guidelines for Americans, 2010  USDA's MyPlate  ASA Prophylaxis  Lung CA Screening    PHILIPP ALCALA MD  [facility]    Identified Health Risks: